# Patient Record
Sex: MALE | Race: AMERICAN INDIAN OR ALASKA NATIVE
[De-identification: names, ages, dates, MRNs, and addresses within clinical notes are randomized per-mention and may not be internally consistent; named-entity substitution may affect disease eponyms.]

---

## 2021-04-24 ENCOUNTER — HOSPITAL ENCOUNTER (EMERGENCY)
Dept: HOSPITAL 5 - ED | Age: 56
LOS: 1 days | Discharge: HOME | End: 2021-04-25
Payer: SELF-PAY

## 2021-04-24 DIAGNOSIS — F17.200: ICD-10-CM

## 2021-04-24 DIAGNOSIS — Z87.898: ICD-10-CM

## 2021-04-24 DIAGNOSIS — Z79.899: ICD-10-CM

## 2021-04-24 DIAGNOSIS — Z21: ICD-10-CM

## 2021-04-24 DIAGNOSIS — G40.909: Primary | ICD-10-CM

## 2021-04-24 DIAGNOSIS — I10: ICD-10-CM

## 2021-04-24 DIAGNOSIS — F14.90: ICD-10-CM

## 2021-04-24 LAB
ALBUMIN SERPL-MCNC: 4.6 G/DL (ref 3.9–5)
ALT SERPL-CCNC: 27 UNITS/L (ref 7–56)
APTT BLD: 25.2 SEC. (ref 24.2–36.6)
BASOPHILS # (AUTO): 0 K/MM3 (ref 0–0.1)
BASOPHILS NFR BLD AUTO: 0.2 % (ref 0–1.8)
BILIRUB UR QL STRIP: (no result)
BLOOD UR QL VISUAL: (no result)
BUN SERPL-MCNC: 9 MG/DL (ref 9–20)
BUN/CREAT SERPL: 5 %
CALCIUM SERPL-MCNC: 9.5 MG/DL (ref 8.4–10.2)
EOSINOPHIL # BLD AUTO: 0.1 K/MM3 (ref 0–0.4)
EOSINOPHIL NFR BLD AUTO: 0.4 % (ref 0–4.3)
HCT VFR BLD CALC: 41 % (ref 35.5–45.6)
HEMOLYSIS INDEX: 5
HGB BLD-MCNC: 14.3 GM/DL (ref 11.8–15.2)
HYALINE CASTS #/AREA URNS LPF: 1 /LPF
INR PPP: 1.19 (ref 0.87–1.13)
LYMPHOCYTES # BLD AUTO: 1.4 K/MM3 (ref 1.2–5.4)
LYMPHOCYTES NFR BLD AUTO: 10.2 % (ref 13.4–35)
MCHC RBC AUTO-ENTMCNC: 35 % (ref 32–34)
MCV RBC AUTO: 88 FL (ref 84–94)
MONOCYTES # (AUTO): 0.4 K/MM3 (ref 0–0.8)
MONOCYTES % (AUTO): 3.1 % (ref 0–7.3)
PH UR STRIP: 6 [PH] (ref 5–7)
PLATELET # BLD: 209 K/MM3 (ref 140–440)
RBC # BLD AUTO: 4.66 M/MM3 (ref 3.65–5.03)
RBC #/AREA URNS HPF: 4 /HPF (ref 0–6)
UROBILINOGEN UR-MCNC: < 2 MG/DL (ref ?–2)
WBC #/AREA URNS HPF: 2 /HPF (ref 0–6)

## 2021-04-24 PROCEDURE — 85025 COMPLETE CBC W/AUTO DIFF WBC: CPT

## 2021-04-24 PROCEDURE — 96361 HYDRATE IV INFUSION ADD-ON: CPT

## 2021-04-24 PROCEDURE — 82550 ASSAY OF CK (CPK): CPT

## 2021-04-24 PROCEDURE — 80307 DRUG TEST PRSMV CHEM ANLYZR: CPT

## 2021-04-24 PROCEDURE — 85610 PROTHROMBIN TIME: CPT

## 2021-04-24 PROCEDURE — 84443 ASSAY THYROID STIM HORMONE: CPT

## 2021-04-24 PROCEDURE — 81001 URINALYSIS AUTO W/SCOPE: CPT

## 2021-04-24 PROCEDURE — 93005 ELECTROCARDIOGRAM TRACING: CPT

## 2021-04-24 PROCEDURE — 36415 COLL VENOUS BLD VENIPUNCTURE: CPT

## 2021-04-24 PROCEDURE — 80320 DRUG SCREEN QUANTALCOHOLS: CPT

## 2021-04-24 PROCEDURE — 85730 THROMBOPLASTIN TIME PARTIAL: CPT

## 2021-04-24 PROCEDURE — 99285 EMERGENCY DEPT VISIT HI MDM: CPT

## 2021-04-24 PROCEDURE — 80053 COMPREHEN METABOLIC PANEL: CPT

## 2021-04-24 PROCEDURE — 83735 ASSAY OF MAGNESIUM: CPT

## 2021-04-24 PROCEDURE — 96375 TX/PRO/DX INJ NEW DRUG ADDON: CPT

## 2021-04-24 PROCEDURE — 70470 CT HEAD/BRAIN W/O & W/DYE: CPT

## 2021-04-24 PROCEDURE — 71045 X-RAY EXAM CHEST 1 VIEW: CPT

## 2021-04-24 PROCEDURE — G0480 DRUG TEST DEF 1-7 CLASSES: HCPCS

## 2021-04-24 PROCEDURE — 96374 THER/PROPH/DIAG INJ IV PUSH: CPT

## 2021-04-24 NOTE — XRAY REPORT
CHEST 1 VIEW 4/24/2021 8:26 PM



INDICATION / CLINICAL INFORMATION: seizure EtOH withdrawal,.



COMPARISON: None available.



FINDINGS:



SUPPORT DEVICES: None.

HEART / MEDIASTINUM: No significant abnormality. 

LUNGS / PLEURA: No significant pulmonary or pleural abnormality. No pneumothorax. 



ADDITIONAL FINDINGS: No significant additional findings.



IMPRESSION:

1. No acute findings.



Signer Name: HUSSEIN Vicente MD 

Signed: 4/24/2021 9:36 PM

Workstation Name: [id number] [id number]

## 2021-04-24 NOTE — CAT SCAN REPORT
NONENHANCED AND CONTRAST-ENHANCED CT SCAN OF THE HEAD: 



INDICATION / CLINICAL INFORMATION:

56 years Male; seizure, etoh withdrawal, hiv +. 60 mL of Omnipaque 300



TECHNIQUE: Routine CT head without contrast. All CT scans at this location are performed using CT dos
e reduction for ALARA by means of automated exposure control. 



COMPARISON: 

None.



FINDINGS:



BRAIN / INTRACRANIAL CONTENTS: 



 No acute hemorrhage, mass effect, midline shift, hydrocephalus, or acute, large territorial infarct.
 No chronic infarct or focal atrophy. Normal brain volume and ventricular/sulcal size for age. No sig
nificant white matter abnormality. No enhancing lesion in the brain



Ventricular system is normal. No hydrocephalus



No space taking lesion or focal parenchymal lesion in the brain; Gray matter white matter in the basa
l ganglia are normal; no enhancing lesion in the brain



CRANIOCERVICAL JUNCTION: No significant abnormality.



ORBITS: No significant abnormality of visualized orbits.



SINUSES / MASTOIDS: No significant abnormality of the visualized paranasal sinuses or mastoid air chen
ls.



ADDITIONAL FINDINGS: None. 



IMPRESSION:

 Normal nonenhanced and contrast-enhanced CT scan of the brain; no enhancing focal parenchymal lesion
; periventricular and deep hemispheric white matter normal; no hydrocephalus



Signer Name: Nadya Holt MD 

Signed: 4/24/2021 10:48 PM

Workstation Name: [id number]

## 2021-04-24 NOTE — EMERGENCY DEPARTMENT REPORT
ED General Adult HPI





- General


Chief complaint: Seizure


Stated complaint: SEIZURE


PUI?: No


Time Seen by Provider: 21 19:51


Source: patient, EMS ( EMS documentation not available at time of chart 

dictation ), RN notes reviewed


Mode of arrival: Ambulatory


Limitations: No Limitations





- History of Present Illness


Initial comments: 





The patient was evaluated in the emergency department for symptoms described in 

the history of present illness.  He/she was evaluated in the context of the 

global COVID-19 pandemic, which necessitated consideration that the patient 

might be at risk for infection with the virus that causes COVID-19.  

Institutional protocols and algorithms that pertain to the evaluation of 

patients at risk for COVID-19 are in a state of rapid change based on 

information released by regulatory bodies including the CDC and federal and 

state organizations.  These policies and algorithms were followed during the 

patient's care in the emergency department.  Please note that these policies, 

procedures and recommendations changed on a rapid basis.





Past medical history: Stimulant use disorder, cocaine, alcohol use disorder, 

schizophrenia, unspecified housing or economic problems, problems related to 

other legal circumstances, HIV positive, unknown CD4 count/viral load, currently

on Biktarvy








Primary CARE provider: Brian Mcfarlane





This is a 56-year-old gentleman.  He is not known to myself previously.


He is brought to the hospital by emergency medical services with a complaint of 

possible seizure.





The patient himself denies physical pain.  He thinks he may have had a seizure 

but he is not sure.  The patient denies headache, neck pain, chest pain, 

abdominal pain, shortness of breath.  He denies cough and urinary symptoms, loss

of taste and smell.  He is also not homicidal or suicidal.  The patient does 

report that she feels shaky, jittery and somewhat anxious.





The patient is not accompanied by friends or family at this time for collateral 

information.  The patient states he was typically homeless, and he is currently 

at a group home facility.





The patient states he was previously incarcerated, and subsequently discharged.








-: Sudden


Consistency: now resolved


Improves with: none


Worsens with: none





- Related Data


                                  Previous Rx's











 Medication  Instructions  Recorded  Last Taken  Type


 


Multivitamin with Folic Acid [Cvs 400 mcg PO QDAY #30 tablet 21 Unknown Rx





One Daily Essential Tablet]    


 


chlordiazePOXIDE [Librium] 25 mg PO Q6H PRN #25 capsule 21 Unknown Rx


 


levETIRAcetam [Keppra TAB] 500 mg PO BID #60 tablet 21 Unknown Rx











                                    Allergies











Allergy/AdvReac Type Severity Reaction Status Date / Time


 


No Known Allergies Allergy   Unverified 21 19:40














ED Review of Systems


ROS: 


Stated complaint: SEIZURE


Other details as noted in HPI





Constitutional: other (Denies loss of taste and smell).  denies: fever


Eyes: denies: eye discharge


ENT: denies: epistaxis


Respiratory: cough (Chronic cough)


Cardiovascular: denies: chest pain


Gastrointestinal: denies: abdominal pain


Genitourinary: denies: dysuria


Neurological: weakness


Psychiatric: anxiety.  denies: auditory hallucinations, visual hallucinations, 

homicidal thoughts, suicidal thoughts





ED Past Medical Hx





- Past Medical History


Hx Hypertension: Yes


Hx HIV: Yes


Additional medical history: ETOH, cocain





- Social History


Smoking Status: Current Every Day Smoker


Substance Use Type: Alcohol, Cocaine





- Medications


Home Medications: 


                                Home Medications











 Medication  Instructions  Recorded  Confirmed  Last Taken  Type


 


Multivitamin with Folic Acid [Cvs 400 mcg PO QDAY #30 tablet 21  Unknown 

Rx





One Daily Essential Tablet]     


 


chlordiazePOXIDE [Librium] 25 mg PO Q6H PRN #25 capsule 21  Unknown Rx


 


levETIRAcetam [Keppra TAB] 500 mg PO BID #60 tablet 21  Unknown Rx














ED Physical Exam





- General


Limitations: No Limitations, Other (Patient anxious and tremulous.  Patient 

found to have tongue fasciculations.)


General appearance: alert, anxious





- Head


Head exam: Present: atraumatic, normocephalic





- Eye


Eye exam: Present: normal appearance, PERRL, EOMI, other (Visual acuity intact 

to finger counting, color perception, reading at a close distance).  Absent: n

ystagmus





- ENT


ENT exam: Present: normal exam, mucous membranes dry, normal external ear exam





- Neck


Neck exam: Present: normal inspection, full ROM.  Absent: tenderness, 

meningismus





- Respiratory


Respiratory exam: Present: normal lung sounds bilaterally.  Absent: respiratory 

distress, wheezes, rales, rhonchi, stridor, decreased breath sounds





- Cardiovascular


Cardiovascular Exam: Present: normal rhythm, tachycardia, normal heart sounds.  

Absent: systolic murmur, diastolic murmur, rubs, gallop





- GI/Abdominal


GI/Abdominal exam: Present: soft.  Absent: distended, tenderness, guarding, 

rebound, rigid, pulsatile mass





- Rectal


Rectal exam: Present: deferred





- Extremities Exam


Extremities exam: Present: normal inspection, full ROM, other (2+ pulses noted 

in the bilateral upper and lower extremities.  There is no palpable cord.   

negative Homans sign.  Muscular compartments are soft.  The pelvis is stable.). 

Absent: pedal edema, calf tenderness





- Back Exam


Back exam: Present: normal inspection, full ROM.  Absent: tenderness, CVA 

tenderness (R), CVA tenderness (L), paraspinal tenderness, vertebral tenderness





- Neurological Exam


Neurological exam: Present: alert, oriented X3, other (No facial droop.  Tongue 

midline.  Extraocular movements intact bilaterally.  Facial sensation intact to 

light touch in V1, V2, V3 distribution bilaterally.  5 and a 5 strength in 4 

extremities.  Sensation intact to light touch in 4 extremities.).  Absent: motor

sensory deficit





- Psychiatric


Psychiatric exam: Present: anxious.  Absent: homicidal ideation, suicidal 

ideation





- Skin


Skin exam: Present: warm, dry, intact, normal color.  Absent: rash





ED Course


                                   Vital Signs











  21





  21:27


 


Temperature 98.2 F


 


Pulse Rate 100 H


 


Respiratory 18





Rate 


 


Blood Pressure 123/81





[Left] 


 


O2 Sat by Pulse 97





Oximetry 














- Reevaluation(s)


Reevaluation #1: 





21 21:46


Differential diagnosis, including but not limited to: Seizure, alcohol 

withdrawal seizure, electrolyte derangement, dehydration, intracranial mass 

lesion








Assessment and plan: 56-year-old gentleman, who is clinically sober, with a GCS 

of 15, nonfocal motor examination, clinically sober, who is not homicidal, not 

suicidal, with tachycardia, tongue fasciculations, and upper extremity tremors, 

also HIV positive, unknown CD4 count or viral load.





I suspect alcohol withdrawal seizure.





Place patient on alcohol withdrawal protocol.  Will load patient with Valium, IV

 fluids, banana bag, obtain appropriate laboratory studies, x-ray the chest, 

EKG, urinalysis, CT scan of the brain, with and without contrast, and reassess 

after initial data points.





Have discussed this plan of care with the patient, who verbalized understanding,

 and is amenable to this plan of care.


21 23:38





Patient is reassessed.  Heart rate 90 bpm.  Blood pressure 118/76.  Patient 

sleeping comfortably in stretcher, and in no acute distress.  Tachycardia 

resolved.  Tremors resolved.  Tongue fasciculations resolved.





CT scan of the brain unremarkable.





Patient does not appear to have an emergent medical condition at this time which

 would require admission to the hospital.  When I initially evaluated the 

patient, he is awake, alert, oriented, sober, without meningeal signs, and 

presentation not suggestive or consistent with intracranial infection.





We will start him on multivitamins, Keppra, Librium, he will need to follow-up 

with an outpatient neurologist or primary care doctor.








ED Medical Decision Making





- Lab Data


Result diagrams: 


                                 21 20:44





                                 21 20:44








                                   Lab Results











  21 Range/Units





  20:44 


 


Plasma/Serum Alcohol  < 0.01  (0-0.07)  %











                                   Vital Signs











  21





  21:27


 


Temperature 98.2 F


 


Pulse Rate 100 H


 


Respiratory 18





Rate 


 


Blood Pressure 123/81





[Left] 


 


O2 Sat by Pulse 97





Oximetry 














- EKG Data


-: EKG Interpreted by Me


EKG shows normal: sinus rhythm


Rate: normal





- EKG Data





21 21:46


EKG interpreted at 19: 57





Sinus rhythm, 99 bpm.  Normal axis, normal intervals, high left ventricular 

voltage.  No endorsement of chest pain.  This is an abnormal EKG.  There is no 

prior for comparison.  This EKG is not a STEMI.





- Radiology Data


Radiology results: pending, report reviewed, image reviewed





[facility] [address] [place] [address] 

XRay Report Signed Patient: ALEXA GRAY MR#: [id number] : 1965 

Acct:[id number] Age/Sex: 56 / M ADM Date: 21 Loc: ED Attending Dr: 

Ordering Physician: JE GONZALES MD Date of Service: 21 Procedure(s):

 XR chest 1V ap Accession Number(s): [id number] cc: JE GONZALES MD [facility] In Minutes: CHEST 1 VIEW 2021 8:26 PM INDICATION / CLINICAL INFORMATION:

 seizure EtOH withdrawal,. COMPARISON: None available. FINDINGS: SUPPORT 

DEVICES: None. HEART / MEDIASTINUM: No significant abnormality. LUNGS / PLEURA: 

No significant pulmonary or pleural abnormality. No pneumothorax. ADDITIONAL 

FINDINGS: No significant additional findings. IMPRESSION: 1. No acute findings. 

Signer Name: HUSSEIN Vicente MD Signed: 2021 9:36 PM Workstation Name: 

[id number] Transcribed By: LAUREN Dictated By: Nicho Vicente MD 

Electronically Authenticated By: Nicho Vicente MD Signed Date/Time: 

21 DD/DT: 21 








[facility] [address] [id number] Cat

 Scan Report Signed Patient: ALEXA GRAY MR#: [id number] : 1965 

Acct:[id number] Age/Sex: 56 / M ADM Date: 21 Loc: ED Attending Dr: 

Ordering Physician: JE GONZALES MD Date of Service: 21 Procedure(s):

 CT head/brain wo/w con Accession Number(s): [id number] cc: JE GONZALES MD 

NONENHANCED AND CONTRAST-ENHANCED CT SCAN OF THE HEAD: INDICATION / CLINICAL 

INFORMATION: 56 years Male; seizure, etoh withdrawal, hiv +. 60 mL of Omnipaque 

300 TECHNIQUE: Routine CT head without contrast. All CT scans at this location 

are performed using CT dose reduction for ALARA by means of automated exposure 

control. COMPARISON: None. FINDINGS: BRAIN / INTRACRANIAL CONTENTS: No acute 

hemorrhage, mass effect, midline shift, hydrocephalus, or acute, large 

territorial infarct. No chronic infarct or focal atrophy. Normal brain volume 

and ventricular/sulcal size for age. No significant white matter abnormality. No

 enhancing lesion in the brain Ventricular system is normal. No hydrocephalus No

 space taking lesion or focal parenchymal lesion in the brain; Gray matter white

 matter in the basal ganglia are normal; no enhancing lesion in the brain 

CRANIOCERVICAL JUNCTION: No significant abnormality. ORBITS: No significant ab

normality of visualized orbits. SINUSES / MASTOIDS: No significant abnormality 

of the visualized paranasal sinuses or mastoid air cells. ADDITIONAL FINDINGS: 

None. 








IMPRESSION: Normal nonenhanced and contrast-enhanced CT scan of the brain; no 

enhancing focal parenchymal lesion; periventricular and deep hemispheric white 

matter normal; no hydrocephalus Signer Name: Nadya Holt MD S

igned: 2021 10:48 PM Workstation Name: [id number] Transcribed By: GLADYS Dictated 

By: Nadya Pierce MD Electronically Authenticated By: Nadya Pierce MD Signed Date/Time: 21 DD/DT: 21 


Critical care attestation.: 


If time is entered above; I have spent that time in minutes in the direct care 

of this critically ill patient, excluding procedure time.








ED Disposition


Clinical Impression: 


 History of seizure, History of HIV infection, History of alcohol use





Disposition: DC-01 TO HOME OR SELFCARE


Is pt being admited?: No


Does the pt Need Aspirin: No


Condition: Good


Instructions:  How to Care for Yourself When You Have HIV, Seizure, Adult, 

Easy-to-Read, Substance Use Disorder


Additional Instructions: 


Recommend that the patient not drive or operate motor vehicles for the next 6 

months, or until cleared to do so by a primary care doctor or neurologist.  

Recommend following up with a primary care doctor or neurologist within the next

 week.  For the patient's convenience, numerous local primary care doctors and 

neurologist have been listed that he may follow-up with.





Recommend that patient have a primary care doctor or neurologist contact medical

 records department, and obtain copies of laboratory studies, radiology studies 

and medical records, to follow-up on nonemergent incidental abnormal findings.





Recommend that patient avoid consumption of alcohol, and recreational drugs.





Please return to the emergency room right away with new pain, worsened pain, 

migration of pain, projectile vomiting, change in mental status, confusion, 

inability to tolerate liquid feeds, new, worsened or different symptoms not 

present on the initial emergency room evaluation.





Take the multivitamin as directed, Librium medication as needed for sensation of

 alcohol withdrawal, and Keppra medication as needed.





We suspect that the patient may have had tremor/convulsion likely secondary to 

alcohol withdrawal.





We also recommend follow-up with a primary care doctor infectious disease 

specialist or health department within the next month to 6 weeks for patient's 

history of HIV.








Referrals: 


TIP NOEL MD [Staff Physician] - 3-5 Days


EBER CORONADO MD [Referring] - 3-5 Days


LOLI BARNEY MD [Staff Physician] - 3-5 Days


[facility] [Provider Group] - 3-5 Days


[facility] [Outside] - 3-5 Days

## 2021-04-25 VITALS — DIASTOLIC BLOOD PRESSURE: 87 MMHG | SYSTOLIC BLOOD PRESSURE: 134 MMHG

## 2021-04-26 NOTE — ELECTROCARDIOGRAPH REPORT
[facility]

                                       

Test Date:    2021               Test Time:    19:56:09

Pat Name:     ALEXA GRAY          Department:   

Patient ID:   [id number]          Room:          

Gender:       M                        Technician:   AIDEN

:          1965               Requested By: JE GONZALES

Order Number: [id number]              Reading MD:   Rodney Muñiz

                                 Measurements

Intervals                              Axis          

Rate:         99                       P:            69

WV:           171                      QRS:          62

QRSD:         102                      T:            40

QT:           335                                    

QTc:          431                                    

                           Interpretive Statements

Sinus rhythm



No previous ECG available for comparison

Electronically Signed On 2021 10:37:49 EDT by Rodney Muñiz